# Patient Record
Sex: FEMALE | Race: WHITE | Employment: OTHER
[De-identification: names, ages, dates, MRNs, and addresses within clinical notes are randomized per-mention and may not be internally consistent; named-entity substitution may affect disease eponyms.]

---

## 2023-11-26 ENCOUNTER — HOSPITAL ENCOUNTER (EMERGENCY)
Facility: HOSPITAL | Age: 33
Discharge: HOME | End: 2023-11-26
Attending: EMERGENCY MEDICINE
Payer: COMMERCIAL

## 2023-11-26 VITALS
BODY MASS INDEX: 49.43 KG/M2 | RESPIRATION RATE: 17 BRPM | OXYGEN SATURATION: 99 % | WEIGHT: 279 LBS | HEART RATE: 97 BPM | HEIGHT: 63 IN | SYSTOLIC BLOOD PRESSURE: 162 MMHG | TEMPERATURE: 98.1 F | DIASTOLIC BLOOD PRESSURE: 91 MMHG

## 2023-11-26 DIAGNOSIS — T19.2XXA FOREIGN BODY IN VAGINA, INITIAL ENCOUNTER: Primary | ICD-10-CM

## 2023-11-26 PROCEDURE — 99282 EMERGENCY DEPT VISIT SF MDM: CPT

## 2023-11-26 ASSESSMENT — PAIN SCALES - GENERAL: PAINLEVEL_OUTOF10: 6

## 2023-11-26 ASSESSMENT — PAIN DESCRIPTION - DESCRIPTORS: DESCRIPTORS: PRESSURE

## 2023-11-26 ASSESSMENT — PAIN - FUNCTIONAL ASSESSMENT: PAIN_FUNCTIONAL_ASSESSMENT: 0-10

## 2023-11-26 ASSESSMENT — PAIN DESCRIPTION - LOCATION: LOCATION: VAGINA;PELVIS

## 2023-11-27 NOTE — ED NOTES
I have reviewed discharge paperwork w/ the pt at this time. Opportunity for questions given.       Kaley Hernandez RN  11/26/23 [id number]